# Patient Record
Sex: MALE | Race: WHITE | NOT HISPANIC OR LATINO | Employment: OTHER | URBAN - METROPOLITAN AREA
[De-identification: names, ages, dates, MRNs, and addresses within clinical notes are randomized per-mention and may not be internally consistent; named-entity substitution may affect disease eponyms.]

---

## 2021-05-25 ENCOUNTER — RECORDS - HEALTHEAST (OUTPATIENT)
Dept: ADMINISTRATIVE | Facility: CLINIC | Age: 66
End: 2021-05-25

## 2024-03-20 ENCOUNTER — OFFICE VISIT (OUTPATIENT)
Dept: PODIATRY | Facility: OTHER | Age: 69
End: 2024-03-20
Attending: PODIATRIST
Payer: COMMERCIAL

## 2024-03-20 VITALS
SYSTOLIC BLOOD PRESSURE: 115 MMHG | TEMPERATURE: 97.5 F | DIASTOLIC BLOOD PRESSURE: 80 MMHG | OXYGEN SATURATION: 92 % | HEART RATE: 73 BPM

## 2024-03-20 DIAGNOSIS — M20.41 ACQUIRED HAMMER TOE DEFORMITY OF LESSER TOE OF BOTH FEET: ICD-10-CM

## 2024-03-20 DIAGNOSIS — L60.3 ONYCHODYSTROPHY: Primary | ICD-10-CM

## 2024-03-20 DIAGNOSIS — M20.42 ACQUIRED HAMMER TOE DEFORMITY OF LESSER TOE OF BOTH FEET: ICD-10-CM

## 2024-03-20 PROCEDURE — 11750 EXCISION NAIL&NAIL MATRIX: CPT | Performed by: PODIATRIST

## 2024-03-20 PROCEDURE — 99203 OFFICE O/P NEW LOW 30 MIN: CPT | Mod: 25 | Performed by: PODIATRIST

## 2024-03-20 PROCEDURE — G0463 HOSPITAL OUTPT CLINIC VISIT: HCPCS | Mod: 25

## 2024-03-20 RX ORDER — GABAPENTIN 300 MG/1
300 CAPSULE ORAL 2 TIMES DAILY
COMMUNITY

## 2024-03-20 RX ORDER — CETIRIZINE HYDROCHLORIDE 10 MG/1
TABLET ORAL
COMMUNITY
Start: 2022-12-06

## 2024-03-20 RX ORDER — AMLODIPINE BESYLATE 2.5 MG/1
1 TABLET ORAL DAILY
COMMUNITY
Start: 2024-01-08

## 2024-03-20 RX ORDER — FOLIC ACID/MULTIVIT,IRON,MINER 0.4MG-18MG
1 TABLET ORAL DAILY
COMMUNITY

## 2024-03-20 RX ORDER — SEMAGLUTIDE 0.68 MG/ML
INJECTION, SOLUTION SUBCUTANEOUS
COMMUNITY
Start: 2024-02-13

## 2024-03-20 RX ORDER — CYCLOBENZAPRINE HCL 5 MG
5 TABLET ORAL
COMMUNITY
Start: 2023-05-26

## 2024-03-20 RX ORDER — DEXAMETHASONE 4 MG/1
TABLET ORAL
COMMUNITY
Start: 2024-03-18

## 2024-03-20 RX ORDER — UMECLIDINIUM BROMIDE AND VILANTEROL TRIFENATATE 62.5; 25 UG/1; UG/1
1 POWDER RESPIRATORY (INHALATION)
COMMUNITY
Start: 2023-07-06

## 2024-03-20 RX ORDER — HYDROXYZINE PAMOATE 25 MG/1
1 CAPSULE ORAL AT BEDTIME
COMMUNITY
Start: 2023-03-08

## 2024-03-20 RX ORDER — ONDANSETRON 4 MG/1
1 TABLET, ORALLY DISINTEGRATING ORAL EVERY 8 HOURS PRN
COMMUNITY
Start: 2023-02-28

## 2024-03-20 RX ORDER — CELECOXIB 200 MG/1
200 CAPSULE ORAL
COMMUNITY
Start: 2022-12-07

## 2024-03-20 RX ORDER — BLOOD SUGAR DIAGNOSTIC
1 STRIP MISCELLANEOUS
COMMUNITY

## 2024-03-20 RX ORDER — BIOTIN 10000 MCG
10000 CAPSULE ORAL
COMMUNITY

## 2024-03-20 RX ORDER — VITAMIN B COMPLEX
50 TABLET ORAL
COMMUNITY

## 2024-03-20 RX ORDER — ASPIRIN 81 MG
1 TABLET, DELAYED RELEASE (ENTERIC COATED) ORAL
COMMUNITY

## 2024-03-20 RX ORDER — CLOPIDOGREL BISULFATE 75 MG/1
75 TABLET ORAL
COMMUNITY
Start: 2023-07-14

## 2024-03-20 RX ORDER — TAMSULOSIN HYDROCHLORIDE 0.4 MG/1
0.4 CAPSULE ORAL DAILY
COMMUNITY
Start: 2023-03-28

## 2024-03-20 RX ORDER — CALCIUM CARBONATE 300MG(750)
400 TABLET,CHEWABLE ORAL
COMMUNITY

## 2024-03-20 RX ORDER — LOSARTAN POTASSIUM AND HYDROCHLOROTHIAZIDE 25; 100 MG/1; MG/1
1 TABLET ORAL DAILY
COMMUNITY

## 2024-03-20 RX ORDER — AZELASTINE 1 MG/ML
2 SPRAY, METERED NASAL
COMMUNITY
Start: 2022-11-11

## 2024-03-20 RX ORDER — HYDROXYZINE HYDROCHLORIDE 25 MG/1
1 TABLET, FILM COATED ORAL 3 TIMES DAILY PRN
COMMUNITY
Start: 2022-06-20

## 2024-03-20 RX ORDER — ACETAMINOPHEN 500 MG
1000 TABLET ORAL
COMMUNITY
Start: 2023-04-03

## 2024-03-20 RX ORDER — ROSUVASTATIN CALCIUM 20 MG/1
20 TABLET, COATED ORAL
COMMUNITY
Start: 2023-04-03

## 2024-03-20 RX ORDER — ALBUTEROL SULFATE 90 UG/1
2 AEROSOL, METERED RESPIRATORY (INHALATION)
COMMUNITY
Start: 2023-07-06

## 2024-03-20 RX ORDER — DOXAZOSIN 2 MG/1
1 TABLET ORAL EVERY EVENING
COMMUNITY
Start: 2023-01-20

## 2024-03-20 RX ORDER — METOPROLOL TARTRATE 25 MG/1
TABLET, FILM COATED ORAL
COMMUNITY
Start: 2023-04-14

## 2024-03-20 RX ORDER — TIOTROPIUM BROMIDE AND OLODATEROL 3.124; 2.736 UG/1; UG/1
SPRAY, METERED RESPIRATORY (INHALATION)
COMMUNITY
Start: 2023-05-09

## 2024-03-20 RX ORDER — ASPIRIN 81 MG/1
81 TABLET ORAL
COMMUNITY

## 2024-03-20 ASSESSMENT — PAIN SCALES - GENERAL: PAINLEVEL: MODERATE PAIN (4)

## 2024-03-20 NOTE — PROGRESS NOTES
Chief complaint: Patient presents with:  Ingrown Toenail: Right 2nd toe      History of Present Illness: This 68 year old male is seen at the request of No ref. provider found for evaluation and suggestions of management of a painful, thickened toenail on the RIGHT second toe. It has been thick for the last few months. He says the toenail has caused him increased pain int he last few weeks. He cut it back but he knows it will come back and dig into his skin again. He would like it permanently removed.    He had his LEFT second and third toenail permanently removed 3-4 years ago for the same problem. He has tried multiple topicals for the thick toenails, but nothing has helped. He has even tried using battery acid, but the nail toes not improve.    Additionally, the patient says he had hammertoe surgery on his LEFT second and third toes a few years ago. He thinks the toes are worse than they were before the surgery. He saw another provider and the patient said he advised the patient to have a tendon cut on the bottom of the toe to allow the toe to relax. He is wondering if they may help his toes because they still curl a lot. The curling of the toes causes cramping and discomfort in the toes, especially at night. He is retired but he is still on his feet a lot throughout the day.    No further pedal complaints today.       /80 (BP Location: Left arm, Patient Position: Sitting, Cuff Size: Adult Regular)   Pulse 73   Temp 97.5  F (36.4  C) (Tympanic)   SpO2 92%     There is no problem list on file for this patient.      History reviewed. No pertinent surgical history.    Current Outpatient Medications   Medication    acetaminophen (TYLENOL) 500 MG tablet    albuterol (PROAIR HFA/PROVENTIL HFA/VENTOLIN HFA) 108 (90 Base) MCG/ACT inhaler    amLODIPine (NORVASC) 2.5 MG tablet    ANORO ELLIPTA 62.5-25 MCG/ACT oral inhaler    aspirin 81 MG EC tablet    azelastine (ASTELIN) 0.1 % nasal spray    Biotin 10 MG CAPS     "blood glucose (FREESTYLE TEST STRIPS) test strip    Calcium Carb-Cholecalciferol 600-5 MG-MCG TABS    celecoxib (CELEBREX) 200 MG capsule    cetirizine (ZYRTEC) 10 MG tablet    clopidogrel (PLAVIX) 75 MG tablet    CONTOUR NEXT TEST test strip    cyclobenzaprine (FLEXERIL) 5 MG tablet    dexAMETHasone (DECADRON) 4 MG tablet    doxazosin (CARDURA) 2 MG tablet    Ferrous Sulfate (IRON PO)    Flax OIL    gabapentin (NEURONTIN) 300 MG capsule    HYDROCHLOROTHIAZIDE 25 MG OR TABS    hydrOXYzine HCl (ATARAX) 25 MG tablet    hydrOXYzine palomo (VISTARIL) 25 MG capsule    losartan-hydrochlorothiazide (HYZAAR) 100-25 MG tablet    Magnesium 400 MG TABS    metoprolol tartrate (LOPRESSOR) 25 MG tablet    Omega-3 Fatty Acids (OMEGA-3 FISH OIL) 1200 MG CAPS    omeprazole (PRILOSEC) 20 MG DR capsule    ondansetron (ZOFRAN ODT) 4 MG ODT tab    OZEMPIC, 0.25 OR 0.5 MG/DOSE, 2 MG/3ML pen    PRILOSEC OTC OR    rosuvastatin (CRESTOR) 20 MG tablet    STIOLTO RESPIMAT 2.5-2.5 MCG/ACT AERS    tamsulosin (FLOMAX) 0.4 MG capsule    tiZANidine (ZANAFLEX) 4 MG tablet    Vitamin D3 (VITAMIN D-1000 MAX ST) 25 mcg (1000 units) tablet     No current facility-administered medications for this visit.          Allergies   Allergen Reactions    Pravastatin Other (See Comments)    Simvastatin Other (See Comments) and Muscle Pain (Myalgia)    Amlodipine Other (See Comments)    Bupropion Other (See Comments) and Unknown     \"made me feel yucky\"    Corticosteroids Unknown     thrush mouth     thrush mouth    PN: thrush mouth    Fludrocortisone     Prednisone     Tamsulosin Other (See Comments) and Unknown     shakiness    Lisinopril Cough       History reviewed. No pertinent family history.    Social History     Socioeconomic History    Marital status: Single     Spouse name: None    Number of children: None    Years of education: None    Highest education level: None   Tobacco Use    Smoking status: Former     Types: Cigarettes     Quit date: 1/1/1995     " Years since quittin.2    Smokeless tobacco: Never    Tobacco comments:     quit 13 years ago   Substance and Sexual Activity    Alcohol use: No     Comment: recovering alcoholic (7+ years)    Drug use: No       ROS: 10 point ROS neg other than the symptoms noted above in the HPI.  EXAM  Constitutional: healthy, alert, and no distress    Psychiatric: mentation appears normal and affect normal/bright    VASCULAR:  -Dorsalis pedis pulse +2/4 b/l  -Posterior tibial pulse +2/4 b/l  -Capillary refill time < 3 seconds to b/l hallux  NEURO:  -Light touch sensation intact to b/l plantar forefoot  DERM:  -Skin temperature, texture and turgor WNL b/l  ---RIGHT second toenail thickened, dystrophic and discolored  -----No open wounds and no drainage  MSK:  -Pain on palpation to the RIGHT dorsal second nail plate    -DORSIFLEXION contracture to MTPJ 2-5 b/l with flexion contracture to PIPJ of digits 2-5 b/l  ---RIGHT foot hammertoes 2-5 are moderately flexible contractures  ---LEFT foot second and third toes are rigid at the PIPJ in a mildly plantaflexed position with a flexible PLANTARFLEXION contracture at the DIPJ    -Muscle strength of ankles +5/5 for dorsiflexion, plantarflexion, ABDUction and ADDuction b/l    ============================================================    ASSESSMENT:  (L60.3) Onychodystrophy  (primary encounter diagnosis)    (M20.41,  M20.42) Acquired hammer toe deformity of lesser toe of both feet          PLAN:  -Patient evaluated and examined. Treatment options discussed with no educational barriers noted.    -Hammertoes:  ---Discussed etiology and treatment options for hammertoes.  ---Discussed how this deformity can progress and what can be done to treat the deformity. The patient had a hammertoe surgery a few years ago on the LEFT second and third toes (he had a LEFT 2nd - 4th PIPJ fusion and a 5th toe IPJ arthroplasty), but he thinks the toes are worse and they cause him cramps in the toes and  discomfort. The PIPJ appears fused and is mildly plantarflexed on both toes. However, the DIPJ is flexible and has a moderate flexion contracture. The patient is wondering if a tendon procedure would help reduce the cramping of his toes.  -Discussed a flexor tenotomy with the patient. This may potentially reduce the flexion contracture at the DIPJ, but it will not fully straighten the toe because the PIPJ is mildly rigidly plantarflexed on both toes. He may notice an improvement in appearance and/or curling and/or cramping of the toes, but he may not notice a significant change. He would have 1-2 sutures in the toe and he would be advised to wear a post-op shoe post surgery (he thinks he has one at home from a prior surgery) with decreased activities for  2-3 weeks while the toe healed. He would like to try it because his toes continue to cramp and cause him discomfort. He is very busy from now and through the summer, so he would like to follow-up in mid October, 2024. He understands he will not have straight toes after the procedure, but it may reduce the curling at the DIPJ.  ----------------------------------------    Onychodystrophy:  -Discussed nail procedure options and etiologies and treatments for painful thickened toenails. Conservatively, patient could opt for a slant back today and keep monitoring the toe since there are no SOI. Discussed risks and benefits and healing course of a nail border avulsion vs. Matrixectomy including post procedure infection or a non-healing wound, both of which could lead to a life threatening infection or amputation of the foot or leg or a proximal amputation. Patient understands the risks and benefits and has decided to proceed with the following:    -Matrixectomy of right second toenail: Written and verbal consent obtained after reviewing risks and benefits of the procedure. Patient understands that although phenol is used in attempt to prevent regrowth of the ingrown  "toenail, the nail can still grow back. There is also a risk of post procedure infection. The patient is advised to return to podiatry or the ED immediately if the patient notices any SOI. The patient is in agreement with this plan and wishes to proceed with the procedure. A time-out was performed to identify the correct patient, limb, digit and procedure.    An alcohol prep pad was applied to  to the base of the right second toe. The digit was injected with 4 mL of 1:1 of 2% Lidocaine plain and 0.5% marcaine plain in a ring block fashion at the base of the toe(s). Adequate local anesthesia was obtained. A ring tournicot was applied to the digit and a chloroprep was applied to the hallux. A freer was used to loosen the nail from the underlying nail bed. An English Anvil and a hemostat were then used to remove the nail in total. A total of three applications of phenol were applied for 30 seconds per application. The digit was rinsed thoroughly with alcohol. The tournicot was removed from the toe and there was a prompt hyperemic response to the hallux. The wound was then dressed with an Silvadene, gauze and 1\" coban. The patient was educated on after procedure care including daily epsom salt soaks starting tomorrow followed by dressing of the toe with an antibiotic cream and a bandaid until the wound site on the toe stops draining (2-3 weeks). Provided education on how to look for signs of infection (redness, swelling, pain, purulence, fever, chills, nausea, vomiting) and the patient was instructed to return to the clinic or Emergency Department immediately if there are any signs of infection.    -Patient in agreement with the above treatment plan and all of patient's questions were answered.      Return to clinic October, 2024 for a flexor tenotomy of the LEFT second and third toes        Liana Torres DPM  "

## 2024-03-20 NOTE — PATIENT INSTRUCTIONS
Nail procedure care:  -Start epsom salt soaks tomorrow. Soak the foot 1-2 times a day for 20 minutes.  -Apply an antibiotic cream, gauze and a bandaid over the toe for the first week after the procedure.  -After one week, continue the epsom salt soaks, but switch to a betadine dressing. Apply a small amount of betadine on gauze or dab the betadine over the toe with gauze and apply another dry gauze over the toe followed by a band aid or tape.  -Do not apply a band aid directly over the nail procedure site without gauze or it will trap too much moisture beneath the band aid.  Note: Continue the epsom salt soaks and dressings every day until the wound is fully healed. You should not see drainage on the bandage for at least two days in a row. Call the clinic if the wound is still moderately draining two weeks after the procedure.    Keep the toe covered at all times until it is completely healed.  -You may develop a black scab over the nail bed--let this fall off on its own and don't pick at it.  -The toe may drain for 2-3 weeks. It is normal for it to have a clear drainage.    Watching for signs of infection:  If the toe has a thick, white pus coming from the procedure site or if the the toe becomes red, swollen, painful, or you begin to feel sick (fever/chills/nausea/vomitting), return to the podiatry clinic immediately or to the Emergency Department room if after hours.      Podiatry can be reached directly at [phone]. Leave a voicemail if there is not an answer.

## 2024-10-08 ENCOUNTER — HOSPITAL ENCOUNTER (EMERGENCY)
Facility: HOSPITAL | Age: 69
Discharge: HOME | End: 2024-10-09
Attending: INTERNAL MEDICINE | Admitting: INTERNAL MEDICINE
Payer: COMMERCIAL

## 2024-10-08 DIAGNOSIS — M54.32 SCIATICA OF LEFT SIDE: ICD-10-CM

## 2024-10-08 PROCEDURE — 96372 THER/PROPH/DIAG INJ SC/IM: CPT | Performed by: INTERNAL MEDICINE

## 2024-10-08 PROCEDURE — 250N000011 HC RX IP 250 OP 636: Performed by: INTERNAL MEDICINE

## 2024-10-08 PROCEDURE — 250N000013 HC RX MED GY IP 250 OP 250 PS 637: Performed by: INTERNAL MEDICINE

## 2024-10-08 PROCEDURE — 99284 EMERGENCY DEPT VISIT MOD MDM: CPT

## 2024-10-08 PROCEDURE — 99283 EMERGENCY DEPT VISIT LOW MDM: CPT | Performed by: INTERNAL MEDICINE

## 2024-10-08 RX ORDER — LIDOCAINE 4 G/G
1 PATCH TOPICAL ONCE
Status: DISCONTINUED | OUTPATIENT
Start: 2024-10-08 | End: 2024-10-09 | Stop reason: HOSPADM

## 2024-10-08 RX ORDER — KETOROLAC TROMETHAMINE 30 MG/ML
30 INJECTION, SOLUTION INTRAMUSCULAR; INTRAVENOUS ONCE
Status: COMPLETED | OUTPATIENT
Start: 2024-10-08 | End: 2024-10-08

## 2024-10-08 RX ORDER — ONDANSETRON 4 MG/1
4 TABLET, ORALLY DISINTEGRATING ORAL ONCE
Status: COMPLETED | OUTPATIENT
Start: 2024-10-08 | End: 2024-10-08

## 2024-10-08 RX ADMIN — ONDANSETRON 4 MG: 4 TABLET, ORALLY DISINTEGRATING ORAL at 23:48

## 2024-10-08 RX ADMIN — KETOROLAC TROMETHAMINE 30 MG: 30 INJECTION, SOLUTION INTRAMUSCULAR at 23:52

## 2024-10-08 RX ADMIN — LIDOCAINE 1 PATCH: 4 PATCH TOPICAL at 23:56

## 2024-10-08 ASSESSMENT — COLUMBIA-SUICIDE SEVERITY RATING SCALE - C-SSRS
6. HAVE YOU EVER DONE ANYTHING, STARTED TO DO ANYTHING, OR PREPARED TO DO ANYTHING TO END YOUR LIFE?: NO
2. HAVE YOU ACTUALLY HAD ANY THOUGHTS OF KILLING YOURSELF IN THE PAST MONTH?: NO
1. IN THE PAST MONTH, HAVE YOU WISHED YOU WERE DEAD OR WISHED YOU COULD GO TO SLEEP AND NOT WAKE UP?: NO

## 2024-10-09 VITALS
TEMPERATURE: 98.9 F | HEART RATE: 71 BPM | OXYGEN SATURATION: 93 % | DIASTOLIC BLOOD PRESSURE: 75 MMHG | RESPIRATION RATE: 18 BRPM | SYSTOLIC BLOOD PRESSURE: 148 MMHG

## 2024-10-09 NOTE — ED TRIAGE NOTES
"Pt came via [facility] for sciatic pain. Pain on back of left leg \"pinched nerve\" radiates all the way down the leg. Was seen in the clinic today and given a \"shot\" pt reports the shot helped for \"seven hours\" . Pt reports taking tylenol and Ibu \"way to much \" when asked. Pt was able to ambulate to bed off ambulance cot with cane.        "

## 2024-10-09 NOTE — ED NOTES
"Pt refused transport to [facility] because \" the third shift dr is an asshole and wont give me anything\"  "

## 2024-10-10 ASSESSMENT — ENCOUNTER SYMPTOMS
NAUSEA: 1
SHORTNESS OF BREATH: 0
LEG PAIN: 1
COUGH: 0
ABDOMINAL PAIN: 0
FEVER: 0

## 2024-10-10 NOTE — ED PROVIDER NOTES
"  History     Chief Complaint   Patient presents with    Leg Pain     The history is provided by the patient.   Leg Pain  Location:  Leg  Injury: no    Leg location:  L leg  Pain details:     Quality:  Aching    Severity:  Severe    Onset quality:  Gradual    Duration:  6 months    Progression:  Waxing and waning  Chronicity:  Recurrent  Associated symptoms: no fever        Allergies:  Allergies   Allergen Reactions    Pravastatin Other (See Comments)    Simvastatin Other (See Comments) and Muscle Pain (Myalgia)    Amlodipine Other (See Comments)    Bupropion Other (See Comments) and Unknown     \"made me feel yucky\"    Corticosteroids Unknown     thrush mouth     thrush mouth    PN: thrush mouth    Fludrocortisone     Prednisone     Tamsulosin Other (See Comments) and Unknown     shakiness    Lisinopril Cough       Problem List:    There are no problems to display for this patient.       Past Medical History:    No past medical history on file.    Past Surgical History:    Past Surgical History:   Procedure Laterality Date    IR LUMBAR PUNCTURE  2019       Family History:    No family history on file.    Social History:  Marital Status:  Single [1]  Social History     Tobacco Use    Smoking status: Former     Current packs/day: 0.00     Types: Cigarettes     Quit date: 1995     Years since quittin.7    Smokeless tobacco: Never    Tobacco comments:     quit 13 years ago   Substance Use Topics    Alcohol use: No     Comment: recovering alcoholic (7+ years)    Drug use: No        Medications:    acetaminophen (TYLENOL) 500 MG tablet  albuterol (PROAIR HFA/PROVENTIL HFA/VENTOLIN HFA) 108 (90 Base) MCG/ACT inhaler  amLODIPine (NORVASC) 2.5 MG tablet  ANORO ELLIPTA 62.5-25 MCG/ACT oral inhaler  aspirin 81 MG EC tablet  azelastine (ASTELIN) 0.1 % nasal spray  Biotin 10 MG CAPS  blood glucose (FREESTYLE TEST STRIPS) test strip  Calcium Carb-Cholecalciferol 600-5 MG-MCG TABS  celecoxib (CELEBREX) 200 MG " capsule  cetirizine (ZYRTEC) 10 MG tablet  clopidogrel (PLAVIX) 75 MG tablet  CONTOUR NEXT TEST test strip  cyclobenzaprine (FLEXERIL) 5 MG tablet  dexAMETHasone (DECADRON) 4 MG tablet  doxazosin (CARDURA) 2 MG tablet  Ferrous Sulfate (IRON PO)  Flax OIL  gabapentin (NEURONTIN) 300 MG capsule  HYDROCHLOROTHIAZIDE 25 MG OR TABS  hydrOXYzine HCl (ATARAX) 25 MG tablet  hydrOXYzine palomo (VISTARIL) 25 MG capsule  losartan-hydrochlorothiazide (HYZAAR) 100-25 MG tablet  Magnesium 400 MG TABS  metoprolol tartrate (LOPRESSOR) 25 MG tablet  Omega-3 Fatty Acids (OMEGA-3 FISH OIL) 1200 MG CAPS  omeprazole (PRILOSEC) 20 MG DR capsule  ondansetron (ZOFRAN ODT) 4 MG ODT tab  OZEMPIC, 0.25 OR 0.5 MG/DOSE, 2 MG/3ML pen  PRILOSEC OTC OR  rosuvastatin (CRESTOR) 20 MG tablet  STIOLTO RESPIMAT 2.5-2.5 MCG/ACT AERS  tamsulosin (FLOMAX) 0.4 MG capsule  tiZANidine (ZANAFLEX) 4 MG tablet  Vitamin D3 (VITAMIN D-1000 MAX ST) 25 mcg (1000 units) tablet          Review of Systems   Constitutional:  Negative for fever.   Respiratory:  Negative for cough and shortness of breath.    Cardiovascular:  Negative for chest pain.   Gastrointestinal:  Positive for nausea. Negative for abdominal pain.   Skin:  Negative for rash.   All other systems reviewed and are negative.      Physical Exam   BP: 145/91  Pulse: 79  Temp: 98.9  F (37.2  C)  Resp: 18  SpO2: 92 %      Physical Exam  Constitutional:       General: He is not in acute distress.     Appearance: Normal appearance. He is not toxic-appearing.   HENT:      Head: Atraumatic.   Eyes:      General: No scleral icterus.     Conjunctiva/sclera: Conjunctivae normal.   Cardiovascular:      Rate and Rhythm: Normal rate.      Heart sounds: Normal heart sounds.   Pulmonary:      Effort: Pulmonary effort is normal. No respiratory distress.      Breath sounds: Normal breath sounds.   Abdominal:      Palpations: Abdomen is soft.      Tenderness: There is no abdominal tenderness.   Musculoskeletal:          General: No deformity.      Cervical back: Neck supple.   Skin:     General: Skin is warm.   Neurological:      Mental Status: He is alert.         ED Course        Procedures                No results found for this or any previous visit (from the past 24 hour(s)).    Medications   ketorolac (TORADOL) injection 30 mg (30 mg Intramuscular $Given 10/8/24 0857)   ondansetron (ZOFRAN ODT) ODT tab 4 mg (4 mg Oral $Given 10/8/24 8535)       Assessments & Plan (with Medical Decision Making)   Left sided sciatica, acute on chronic  Toradol IM given  D C home, follow-up with  I have reviewed the nursing notes.    I have reviewed the findings, diagnosis, plan and need for follow up with the patient.        Discharge Medication List as of 10/8/2024 11:59 PM          Final diagnoses:   Sciatica of left side       10/8/2024   [facility]       Robbin Kaufman MD  10/10/24 8981

## (undated) RX ORDER — BUPIVACAINE HYDROCHLORIDE 5 MG/ML
INJECTION, SOLUTION EPIDURAL; INTRACAUDAL
Status: DISPENSED
Start: 2024-03-20

## (undated) RX ORDER — GINSENG 100 MG
CAPSULE ORAL
Status: DISPENSED
Start: 2024-03-20

## (undated) RX ORDER — LIDOCAINE HYDROCHLORIDE 20 MG/ML
INJECTION, SOLUTION INFILTRATION; PERINEURAL
Status: DISPENSED
Start: 2024-03-20